# Patient Record
Sex: FEMALE | Race: WHITE | NOT HISPANIC OR LATINO | URBAN - METROPOLITAN AREA
[De-identification: names, ages, dates, MRNs, and addresses within clinical notes are randomized per-mention and may not be internally consistent; named-entity substitution may affect disease eponyms.]

---

## 2018-09-30 ENCOUNTER — EMERGENCY (EMERGENCY)
Facility: HOSPITAL | Age: 48
LOS: 1 days | Discharge: HOME | End: 2018-09-30
Attending: EMERGENCY MEDICINE
Payer: COMMERCIAL

## 2018-09-30 VITALS
OXYGEN SATURATION: 98 % | TEMPERATURE: 98 F | SYSTOLIC BLOOD PRESSURE: 126 MMHG | HEART RATE: 65 BPM | DIASTOLIC BLOOD PRESSURE: 63 MMHG | RESPIRATION RATE: 20 BRPM

## 2018-09-30 VITALS
SYSTOLIC BLOOD PRESSURE: 132 MMHG | OXYGEN SATURATION: 99 % | RESPIRATION RATE: 18 BRPM | DIASTOLIC BLOOD PRESSURE: 68 MMHG | HEART RATE: 62 BPM

## 2018-09-30 LAB
ANION GAP SERPL CALC-SCNC: 10 MMOL/L — SIGNIFICANT CHANGE UP (ref 5–17)
APTT BLD: 26.1 SEC — LOW (ref 27.5–37.4)
BUN SERPL-MCNC: 15 MG/DL — SIGNIFICANT CHANGE UP (ref 8–20)
CALCIUM SERPL-MCNC: 8.7 MG/DL — SIGNIFICANT CHANGE UP (ref 8.6–10.2)
CHLORIDE SERPL-SCNC: 107 MMOL/L — SIGNIFICANT CHANGE UP (ref 98–107)
CK SERPL-CCNC: 82 U/L — SIGNIFICANT CHANGE UP (ref 25–170)
CO2 SERPL-SCNC: 22 MMOL/L — SIGNIFICANT CHANGE UP (ref 22–29)
CREAT SERPL-MCNC: 0.49 MG/DL — LOW (ref 0.5–1.3)
D DIMER BLD IA.RAPID-MCNC: 216 NG/ML DDU — SIGNIFICANT CHANGE UP
GLUCOSE SERPL-MCNC: 91 MG/DL — SIGNIFICANT CHANGE UP (ref 70–115)
HCT VFR BLD CALC: 30.1 % — LOW (ref 37–47)
HGB BLD-MCNC: 8.2 G/DL — LOW (ref 12–16)
INR BLD: 1.04 RATIO — SIGNIFICANT CHANGE UP (ref 0.88–1.16)
MCHC RBC-ENTMCNC: 18.7 PG — LOW (ref 27–31)
MCHC RBC-ENTMCNC: 27.2 G/DL — LOW (ref 32–36)
MCV RBC AUTO: 68.6 FL — LOW (ref 81–99)
PLATELET # BLD AUTO: 204 K/UL — SIGNIFICANT CHANGE UP (ref 150–400)
POTASSIUM SERPL-MCNC: 4.7 MMOL/L — SIGNIFICANT CHANGE UP (ref 3.5–5.3)
POTASSIUM SERPL-SCNC: 4.7 MMOL/L — SIGNIFICANT CHANGE UP (ref 3.5–5.3)
PROTHROM AB SERPL-ACNC: 11.4 SEC — SIGNIFICANT CHANGE UP (ref 9.8–12.7)
RBC # BLD: 4.39 M/UL — LOW (ref 4.4–5.2)
RBC # FLD: 17.4 % — HIGH (ref 11–15.6)
SODIUM SERPL-SCNC: 139 MMOL/L — SIGNIFICANT CHANGE UP (ref 135–145)
TROPONIN T SERPL-MCNC: <0.01 NG/ML — SIGNIFICANT CHANGE UP (ref 0–0.06)
TROPONIN T SERPL-MCNC: <0.01 NG/ML — SIGNIFICANT CHANGE UP (ref 0–0.06)
WBC # BLD: 4.4 K/UL — LOW (ref 4.8–10.8)
WBC # FLD AUTO: 4.4 K/UL — LOW (ref 4.8–10.8)

## 2018-09-30 PROCEDURE — 93010 ELECTROCARDIOGRAM REPORT: CPT

## 2018-09-30 PROCEDURE — 85027 COMPLETE CBC AUTOMATED: CPT

## 2018-09-30 PROCEDURE — 82550 ASSAY OF CK (CPK): CPT

## 2018-09-30 PROCEDURE — 93005 ELECTROCARDIOGRAM TRACING: CPT

## 2018-09-30 PROCEDURE — 85730 THROMBOPLASTIN TIME PARTIAL: CPT

## 2018-09-30 PROCEDURE — 36415 COLL VENOUS BLD VENIPUNCTURE: CPT

## 2018-09-30 PROCEDURE — 84484 ASSAY OF TROPONIN QUANT: CPT

## 2018-09-30 PROCEDURE — 99285 EMERGENCY DEPT VISIT HI MDM: CPT

## 2018-09-30 PROCEDURE — 80048 BASIC METABOLIC PNL TOTAL CA: CPT

## 2018-09-30 PROCEDURE — 99283 EMERGENCY DEPT VISIT LOW MDM: CPT | Mod: 25

## 2018-09-30 PROCEDURE — 71045 X-RAY EXAM CHEST 1 VIEW: CPT

## 2018-09-30 PROCEDURE — 85610 PROTHROMBIN TIME: CPT

## 2018-09-30 PROCEDURE — 71045 X-RAY EXAM CHEST 1 VIEW: CPT | Mod: 26

## 2018-09-30 PROCEDURE — 85379 FIBRIN DEGRADATION QUANT: CPT

## 2018-09-30 RX ORDER — SODIUM CHLORIDE 9 MG/ML
3 INJECTION INTRAMUSCULAR; INTRAVENOUS; SUBCUTANEOUS ONCE
Qty: 0 | Refills: 0 | Status: COMPLETED | OUTPATIENT
Start: 2018-09-30 | End: 2018-09-30

## 2018-09-30 RX ADMIN — SODIUM CHLORIDE 3 MILLILITER(S): 9 INJECTION INTRAMUSCULAR; INTRAVENOUS; SUBCUTANEOUS at 14:30

## 2018-09-30 NOTE — ED PROVIDER NOTE - CHPI ED SYMPTOMS NEG
no dizziness/no fever/no diaphoresis/no shortness of breath/no chills/no nausea/no syncope/no cough/no back pain/no vomiting

## 2018-09-30 NOTE — ED PROVIDER NOTE - MEDICAL DECISION MAKING DETAILS
PT WITH CHEST PAIN . NORMAL EKG AND NO CARDIAC RISK FACTORS. WILL REPEAT TROPONIN AND OBTAIN CARDIAC CONSULT

## 2018-09-30 NOTE — ED PROVIDER NOTE - NOTES
TELEPHONE CONSULT DR RIZZO - PT WITH NO RISK FACTORS. IF TWO NEG ENZYME, OUT PT FU FOR STRESS ECHO IN OFFICE

## 2018-09-30 NOTE — ED PROVIDER NOTE - CARE PROVIDER_API CALL
Berenice Haider), Cardiology; Cardiovascular Disease; Internal Medicine  [address] [place] [address]  Phone: [phone]  Fax: [phone]

## 2018-09-30 NOTE — ED PROVIDER NOTE - OBJECTIVE STATEMENT
CC CHEST PAIN  47 YO FEMALE WITH ABOVE CC. SUDDEN ONSET CHEST TIGHTNESS FOR A COUPLE OF DAYS. TIGHT FEELING RADIATES TO LEFT SHOULDER. NO ASSOCIATED SOB, N, V, DIAPHORESIS. NO PAST SIMILAR EPISODES.  NO CARDIAC RISK FACTORS  MED HX NEGATIVE

## 2018-09-30 NOTE — ED ADULT NURSE NOTE - NEURO WDL
no Alert and oriented to person, place and time, memory intact, behavior appropriate to situation, PERRL.

## 2018-09-30 NOTE — ED ADULT NURSE NOTE - OBJECTIVE STATEMENT
recd pt  A/Ox3, pt c/o 8/10 left sided CP, that started since 8am this morning, pt thought it was related to her anxiety and states took some vistaril this morning w/o relief, went to urgent care and received 4 baby aspirin. pt describes pain as squeezing, pt states left arm also feels heavy and reciprocates the cp with movement of arm. pt denies sob. Respirations are even and unlabored, lungs cta, +bowel x4 quads, abdomen soft, nontender/nondistended, no guarding, rebound or rigidity noted, skin w/d/i.

## 2018-10-01 RX ORDER — HYDROXYZINE HCL 10 MG
0 TABLET ORAL
Qty: 0 | Refills: 0 | COMMUNITY

## 2018-10-01 RX ORDER — ESCITALOPRAM OXALATE 10 MG/1
0 TABLET, FILM COATED ORAL
Qty: 0 | Refills: 0 | COMMUNITY

## 2020-05-27 ENCOUNTER — APPOINTMENT (OUTPATIENT)
Dept: DERMATOLOGY | Facility: CLINIC | Age: 50
End: 2020-05-27
Payer: COMMERCIAL

## 2020-05-27 ENCOUNTER — RESULT REVIEW (OUTPATIENT)
Age: 50
End: 2020-05-27

## 2020-05-27 PROCEDURE — 11104 PUNCH BX SKIN SINGLE LESION: CPT

## 2020-05-27 PROCEDURE — 99202 OFFICE O/P NEW SF 15 MIN: CPT | Mod: 25

## 2020-06-03 PROBLEM — Z00.00 ENCOUNTER FOR PREVENTIVE HEALTH EXAMINATION: Status: ACTIVE | Noted: 2020-06-03

## 2020-06-05 ENCOUNTER — APPOINTMENT (OUTPATIENT)
Dept: DERMATOLOGY | Facility: CLINIC | Age: 50
End: 2020-06-05
Payer: COMMERCIAL

## 2020-06-05 PROCEDURE — 99213 OFFICE O/P EST LOW 20 MIN: CPT

## 2020-06-08 ENCOUNTER — LABORATORY RESULT (OUTPATIENT)
Age: 50
End: 2020-06-08

## 2020-06-08 ENCOUNTER — APPOINTMENT (OUTPATIENT)
Dept: RHEUMATOLOGY | Facility: CLINIC | Age: 50
End: 2020-06-08
Payer: COMMERCIAL

## 2020-06-08 VITALS
DIASTOLIC BLOOD PRESSURE: 85 MMHG | SYSTOLIC BLOOD PRESSURE: 159 MMHG | OXYGEN SATURATION: 98 % | BODY MASS INDEX: 49.08 KG/M2 | HEART RATE: 70 BPM | WEIGHT: 250 LBS | TEMPERATURE: 98 F | HEIGHT: 60 IN

## 2020-06-08 DIAGNOSIS — Z84.89 FAMILY HISTORY OF OTHER SPECIFIED CONDITIONS: ICD-10-CM

## 2020-06-08 DIAGNOSIS — Z78.9 OTHER SPECIFIED HEALTH STATUS: ICD-10-CM

## 2020-06-08 DIAGNOSIS — L56.8 OTHER SPECIFIED ACUTE SKIN CHANGES DUE TO ULTRAVIOLET RADIATION: ICD-10-CM

## 2020-06-08 DIAGNOSIS — E55.9 VITAMIN D DEFICIENCY, UNSPECIFIED: ICD-10-CM

## 2020-06-08 DIAGNOSIS — R21 RASH AND OTHER NONSPECIFIC SKIN ERUPTION: ICD-10-CM

## 2020-06-08 DIAGNOSIS — Z82.61 FAMILY HISTORY OF ARTHRITIS: ICD-10-CM

## 2020-06-08 DIAGNOSIS — Z98.84 BARIATRIC SURGERY STATUS: ICD-10-CM

## 2020-06-08 DIAGNOSIS — Z82.0 FAMILY HISTORY OF EPILEPSY AND OTHER DISEASES OF THE NERVOUS SYSTEM: ICD-10-CM

## 2020-06-08 PROCEDURE — 99204 OFFICE O/P NEW MOD 45 MIN: CPT | Mod: 25

## 2020-06-08 PROCEDURE — 36415 COLL VENOUS BLD VENIPUNCTURE: CPT

## 2020-06-08 RX ORDER — NAPROXEN SODIUM 220 MG
TABLET ORAL
Refills: 0 | Status: ACTIVE | COMMUNITY

## 2020-06-08 NOTE — REVIEW OF SYSTEMS
[Feeling Tired] : feeling tired [Arthralgias] : arthralgias [Joint Pain] : joint pain [Skin Lesions] : skin lesion [Negative] : Heme/Lymph

## 2020-06-09 LAB
25(OH)D3 SERPL-MCNC: 14 NG/ML
ALBUMIN MFR SERPL ELPH: 57.4 %
ALBUMIN SERPL ELPH-MCNC: 4.1 G/DL
ALBUMIN SERPL-MCNC: 3.6 G/DL
ALBUMIN/GLOB SERPL: 1.3 RATIO
ALP BLD-CCNC: 91 U/L
ALPHA1 GLOB MFR SERPL ELPH: 4.4 %
ALPHA1 GLOB SERPL ELPH-MCNC: 0.3 G/DL
ALPHA2 GLOB MFR SERPL ELPH: 13.6 %
ALPHA2 GLOB SERPL ELPH-MCNC: 0.9 G/DL
ALT SERPL-CCNC: 15 U/L
ANA SER IF-ACNC: NEGATIVE
ANION GAP SERPL CALC-SCNC: 11 MMOL/L
AST SERPL-CCNC: 33 U/L
B-GLOBULIN MFR SERPL ELPH: 13.4 %
B-GLOBULIN SERPL ELPH-MCNC: 0.8 G/DL
BILIRUB SERPL-MCNC: 0.4 MG/DL
BUN SERPL-MCNC: 17 MG/DL
C3 SERPL-MCNC: 113 MG/DL
C4 SERPL-MCNC: 29 MG/DL
CALCIUM SERPL-MCNC: 9 MG/DL
CENTROMERE IGG SER-ACNC: <0.2 CD:130001892
CHLORIDE SERPL-SCNC: 99 MMOL/L
CK SERPL-CCNC: 98 U/L
CO2 SERPL-SCNC: 24 MMOL/L
CREAT SERPL-MCNC: 0.5 MG/DL
CRP SERPL-MCNC: <0.1 MG/DL
DEPRECATED KAPPA LC FREE/LAMBDA SER: 1.07 RATIO
DSDNA AB SER-ACNC: <12 IU/ML
ENA JO1 AB SER IA-ACNC: <0.2 AL
ENA RNP AB SER IA-ACNC: <0.2 AL
ENA SM AB SER IA-ACNC: <0.2 AL
ENA SS-A AB SER IA-ACNC: <0.2 AL
ENA SS-B AB SER IA-ACNC: <0.2 AL
GAMMA GLOB FLD ELPH-MCNC: 0.7 G/DL
GAMMA GLOB MFR SERPL ELPH: 11.2 %
GLUCOSE SERPL-MCNC: 84 MG/DL
HAV IGM SER QL: NONREACTIVE
HBV CORE IGM SER QL: NONREACTIVE
HBV SURFACE AG SER QL: NONREACTIVE
HCV AB SER QL: NONREACTIVE
HCV S/CO RATIO: 0.09 S/CO
IGA SER QL IEP: 247 MG/DL
IGG SER QL IEP: 625 MG/DL
IGM SER QL IEP: 47 MG/DL
INTERPRETATION SERPL IEP-IMP: NORMAL
KAPPA LC CSF-MCNC: 1.16 MG/DL
KAPPA LC SERPL-MCNC: 1.24 MG/DL
LUPUS ANTICOAGULANT CASCADE REFLEX: NORMAL
M PROTEIN SPEC IFE-MCNC: NORMAL
MPO AB + PR3 PNL SER: NORMAL
POTASSIUM SERPL-SCNC: 4.7 MMOL/L
PROT SERPL-MCNC: 6.3 G/DL
RHEUMATOID FACT SER QL: <10 IU/ML
SODIUM SERPL-SCNC: 134 MMOL/L
THYROGLOB AB SERPL-ACNC: <20 IU/ML
THYROPEROXIDASE AB SERPL IA-ACNC: <10 IU/ML
TSH SERPL-ACNC: 1.83 UIU/ML

## 2020-06-09 NOTE — HISTORY OF PRESENT ILLNESS
[FreeTextEntry1] : 49-year-old  female with a history of depression presents as a new patient today. She presents to rule out possible autoimmune disease.\par \par She also has a history of gastric bypass. She states that for the last couple of years now she has been dealing with complaints of shingles. The initial episode began about 3 years ago. It had a classic presentation. With each episode she typically uses antiviral medication and it resolves over the course of a few weeks. Each episode could happen about once a month. More recently she noticed the lesions on her face, it did not respond to the antiviral and she decided to see dermatology. She start dermatology she was told that she has a lupus-like rash. She did have a skin biopsy which was read as possible autoimmune in nature given interface changes but also drug eruption could not be ruled out.\par \par She admits to a total of 8 pregnancies but has had one full-term delivery. She has had 6 miscarriages with one stillbirth. She states that she was told it was secondary to protein C deficiency, during the pregnancy she did use a blood thinner. She had one blood clot post partum within the week off giving birth to her child. Currently she does not use aspirin. She does see hematology once a year at least. She admits that her sister has also had issues with getting pregnant.\par \par She admits to some hair thinning. She admits to dry eyes and dry mouth and occasionally gets blisters on her mouth. She denies cold sensitivity. She denies psoriasis or colitis. She denies asthma, frequent sinus infections or ear infections. She Admits to joint pains particularly in her knees. She has intentionally lost20+ pounds in the last year.\par \par She is an average appetite and denies weight loss. She denies fevers or chills or night sweats. She is otherwise up-to-date on her age-appropriate screenings.

## 2020-06-09 NOTE — PHYSICAL EXAM
[General Appearance - Alert] : alert [General Appearance - Well Nourished] : well nourished [Sclera] : the sclera and conjunctiva were normal [General Appearance - Well Developed] : well developed [Oropharynx] : the oropharynx was normal [Neck Appearance] : the appearance of the neck was normal [Respiration, Rhythm And Depth] : normal respiratory rhythm and effort [Auscultation Breath Sounds / Voice Sounds] : lungs were clear to auscultation bilaterally [Heart Sounds] : normal S1 and S2 [Edema] : there was no peripheral edema [Full Pulse] : the pedal pulses are present [Abdomen Tenderness] : non-tender [Cervical Lymph Nodes Enlarged Anterior Bilaterally] : anterior cervical [Supraclavicular Lymph Nodes Enlarged Bilaterally] : supraclavicular [Abnormal Walk] : normal gait [No Spinal Tenderness] : no spinal tenderness [Nail Clubbing] : no clubbing  or cyanosis of the fingernails [Musculoskeletal - Swelling] : no joint swelling seen [Motor Tone] : muscle strength and tone were normal [Deep Tendon Reflexes (DTR)] : deep tendon reflexes were 2+ and symmetric [Motor Exam] : the motor exam was normal [Oriented To Time, Place, And Person] : oriented to person, place, and time [Impaired Insight] : insight and judgment were intact [Affect] : the affect was normal [FreeTextEntry1] : few skin lesions, arms and face, on photosensitive areas

## 2020-06-09 NOTE — PHYSICAL EXAM
[General Appearance - Well Nourished] : well nourished [General Appearance - Alert] : alert [General Appearance - Well Developed] : well developed [Sclera] : the sclera and conjunctiva were normal [Oropharynx] : the oropharynx was normal [Neck Appearance] : the appearance of the neck was normal [Respiration, Rhythm And Depth] : normal respiratory rhythm and effort [Heart Sounds] : normal S1 and S2 [Auscultation Breath Sounds / Voice Sounds] : lungs were clear to auscultation bilaterally [Full Pulse] : the pedal pulses are present [Edema] : there was no peripheral edema [Cervical Lymph Nodes Enlarged Anterior Bilaterally] : anterior cervical [Abdomen Tenderness] : non-tender [Supraclavicular Lymph Nodes Enlarged Bilaterally] : supraclavicular [No Spinal Tenderness] : no spinal tenderness [Abnormal Walk] : normal gait [Musculoskeletal - Swelling] : no joint swelling seen [Nail Clubbing] : no clubbing  or cyanosis of the fingernails [Motor Tone] : muscle strength and tone were normal [Deep Tendon Reflexes (DTR)] : deep tendon reflexes were 2+ and symmetric [Oriented To Time, Place, And Person] : oriented to person, place, and time [Motor Exam] : the motor exam was normal [Impaired Insight] : insight and judgment were intact [Affect] : the affect was normal [FreeTextEntry1] : few skin lesions, arms and face, on photosensitive areas

## 2020-06-09 NOTE — ASSESSMENT
[FreeTextEntry1] : 49 year old  female with a history of obesity status post gastric bypass surgery, depression presents for further evaluation of rashes.\par \par She states that she was in her usual state of health until about 3 years ago when she had an outbreak of shingles. It had a classic appearance. Over the last 3 years she has been dealing with facial rashes, but typically respond to antiviral treatment. She has been getting them almost every month. Recently she went to see dermatology and had a skin biopsy which is consistent with either a drug eruption versus given the interface changes possibly an autoimmune disease as well. She was told by dermatology that she may have lupus.\par \par Current review of systems is positive for photosensitive rashes, itching, some hair thinning dry eyes, and she also admits to multiple miscarriages a total of 6 but which was attributed to protein C deficiency in the past.\par \par Today, on examination she has full range of motion of her joints, she has osteoarthritis in her knees but the right is worse than the left. She also has some excoriated lesions on both arms which appear to be photosensitive in nature. She also has some photosensitive lesions on her chest wall and the face. There are no lesions on the non-sun exposed areas.\par No labs available for review today.\par \par Based on her history and exam certainly autoimmune disease is in the differential diagnosis for the photosensitive rash. This includes lupus, in particular cutaneous lupus, myositis, antiphospholipid antibody syndrome is also in the differential diagnosis, drug-induced lupus is another possibility.\par \par Plan-\par -She is to have baseline labs done to rule out the above conditions\par -Recommend avoiding sun exposure for now\par -To use a topical ointment prescribed by dermatology\par -I will be calling her with the results\par -Depending on the results we'll consider Plaquenil use of a photosensitive rash\par -She is agreeable to that time\par -Followup 6 weeks\par -She suffered a call if her symptoms worsen

## 2020-06-09 NOTE — CONSULT LETTER
[Dear  ___] : Dear  [unfilled], [Consult Letter:] : I had the pleasure of evaluating your patient, [unfilled]. [Please see my note below.] : Please see my note below. [Consult Closing:] : Thank you very much for allowing me to participate in the care of this patient.  If you have any questions, please do not hesitate to contact me. [Sincerely,] : Sincerely, [FreeTextEntry3] : Leticia Jules D.O\par

## 2020-06-10 LAB
CARDIOLIPIN IGM SER-MCNC: 5 MPL
CARDIOLIPIN IGM SER-MCNC: <5 GPL
HISTONE AB SER QL: 0.3 UNITS

## 2020-06-15 PROBLEM — E55.9 VITAMIN D INSUFFICIENCY: Status: ACTIVE | Noted: 2020-06-15

## 2020-06-15 LAB
CCP AB SER IA-ACNC: <8 UNITS
RF+CCP IGG SER-IMP: NEGATIVE

## 2020-06-15 RX ORDER — ERGOCALCIFEROL 1.25 MG/1
1.25 MG CAPSULE, LIQUID FILLED ORAL
Qty: 4 | Refills: 6 | Status: ACTIVE | COMMUNITY
Start: 2020-06-15 | End: 1900-01-01

## 2020-08-07 ENCOUNTER — APPOINTMENT (OUTPATIENT)
Dept: RHEUMATOLOGY | Facility: CLINIC | Age: 50
End: 2020-08-07

## 2021-07-28 DIAGNOSIS — G47.00 INSOMNIA, UNSPECIFIED: ICD-10-CM

## 2021-07-28 RX ORDER — CITALOPRAM 40 MG/1
40 TABLET, FILM COATED ORAL
Refills: 0 | Status: ACTIVE | COMMUNITY

## 2021-07-28 RX ORDER — ESCITALOPRAM OXALATE 20 MG/1
20 TABLET, FILM COATED ORAL
Refills: 0 | Status: DISCONTINUED | COMMUNITY
End: 2021-07-28

## 2021-08-04 ENCOUNTER — APPOINTMENT (OUTPATIENT)
Dept: CARDIOLOGY | Facility: CLINIC | Age: 51
End: 2021-08-04
Payer: COMMERCIAL

## 2021-08-04 VITALS
RESPIRATION RATE: 16 BRPM | SYSTOLIC BLOOD PRESSURE: 114 MMHG | DIASTOLIC BLOOD PRESSURE: 76 MMHG | BODY MASS INDEX: 39.46 KG/M2 | HEART RATE: 68 BPM | WEIGHT: 201 LBS | HEIGHT: 60 IN

## 2021-08-04 DIAGNOSIS — Z78.9 OTHER SPECIFIED HEALTH STATUS: ICD-10-CM

## 2021-08-04 DIAGNOSIS — Z82.49 FAMILY HISTORY OF ISCHEMIC HEART DISEASE AND OTHER DISEASES OF THE CIRCULATORY SYSTEM: ICD-10-CM

## 2021-08-04 DIAGNOSIS — Z83.438 FAMILY HISTORY OF OTHER DISORDER OF LIPOPROTEIN METABOLISM AND OTHER LIPIDEMIA: ICD-10-CM

## 2021-08-04 DIAGNOSIS — D64.9 ANEMIA, UNSPECIFIED: ICD-10-CM

## 2021-08-04 PROCEDURE — 93000 ELECTROCARDIOGRAM COMPLETE: CPT

## 2021-08-04 PROCEDURE — 99244 OFF/OP CNSLTJ NEW/EST MOD 40: CPT

## 2021-08-04 RX ORDER — CYANOCOBALAMIN, ISOPROPYL ALCOHOL 1000MCG/ML
KIT INJECTION
Refills: 0 | Status: ACTIVE | COMMUNITY

## 2021-08-04 NOTE — DISCUSSION/SUMMARY
[FreeTextEntry1] : ECG\par \par \par \par \par \par Plan\par 1. Full set of bw to include lipids, TSH, A1C and renal fxn/ electrolytes to r/o imbalance\par 2. Suggest OTC magnesium 260 mg QD to help with palps.\par 3. Schedule echo, EST and 24 hr Holter to r/o arrhythmia and risk stratify.\par 4. Continue weight loss efforts.\par \par Clinical follow up after testing is completed.

## 2021-08-04 NOTE — HISTORY OF PRESENT ILLNESS
[FreeTextEntry1] : Exercises with a bicycle and walks. Denies any exertional discomfort.\par \par Never any exertional chest pain, orthopnea or PND\par \par Did have BW done through heme. last week which is not available.

## 2021-08-04 NOTE — ASSESSMENT
[FreeTextEntry1] : ECG: Normal sinus rhythm at 68.  Nonspecific T wave maladies.\par \par No pertinent lab data is available at this time.\par \par Impression:\par 1.  Recent onset of palpitations likely due to APCs and PVCs seen on self telemetry.\par \par 2.  No other active cardiac symptoms exertional or otherwise.\par \par 3.  Marked fluctuations in weight related to initially to gastric bypass surgery and more recently to weight watchers and exercise.\par By self-report down 100 pounds in the last 18 months\par \par 4.  Nonspecific ECG abnormalities\par \par 5.  No history of hypertension, diabetes known hyperlipidemia or tobacco use.\par \par Angel:\par 1.  24-hour Holter monitor to assess the arrhythmia.\par \par 2.  Echocardiogram looking for cardiomegaly as might be related to longstanding history of morbid obesity\par \par 3.  Exercise stress test to risk stratify with regard to exertion.\par \par 4.  Magnesium supplementation encouraged 250 mg daily.

## 2021-08-04 NOTE — REASON FOR VISIT
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 \par \par Symptom started in June which is associated with a throat tightening sensation. Episodes, ,which are nonexertional, last only a few minutes and are not associated with any CP.  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  Decreased caffeine recently use to drink 30 oz of coffee now 10 oz.\par Poor sleeping habit, by her report she was tested for JONATHAN  many years ago. \par No significant EtOH.\par \par Only other medical hx includes anemia caused by her gastric bypass and a malabsorption issue. There was x 1 episode of CP during a period where she was anemic.  She continues to see hematology for this. \par \par Max weight  = 343, admits that weight fluctuates with stress. Recently loss 100 lbs in the last 18 months. \par Exercise and weight watchers\par \par Went into menopause 13 months ago.

## 2021-08-09 ENCOUNTER — APPOINTMENT (OUTPATIENT)
Dept: CARDIOLOGY | Facility: CLINIC | Age: 51
End: 2021-08-09
Payer: COMMERCIAL

## 2021-08-09 PROCEDURE — ZZZZZ: CPT

## 2021-09-03 ENCOUNTER — APPOINTMENT (OUTPATIENT)
Dept: CARDIOLOGY | Facility: CLINIC | Age: 51
End: 2021-09-03
Payer: COMMERCIAL

## 2021-09-03 PROCEDURE — 93306 TTE W/DOPPLER COMPLETE: CPT

## 2021-09-03 PROCEDURE — 93015 CV STRESS TEST SUPVJ I&R: CPT

## 2021-09-13 ENCOUNTER — NON-APPOINTMENT (OUTPATIENT)
Age: 51
End: 2021-09-13

## 2021-09-23 ENCOUNTER — APPOINTMENT (OUTPATIENT)
Dept: CARDIOLOGY | Facility: CLINIC | Age: 51
End: 2021-09-23
Payer: COMMERCIAL

## 2021-09-23 VITALS
DIASTOLIC BLOOD PRESSURE: 70 MMHG | WEIGHT: 195 LBS | HEART RATE: 72 BPM | SYSTOLIC BLOOD PRESSURE: 105 MMHG | HEIGHT: 60 IN | BODY MASS INDEX: 38.28 KG/M2 | OXYGEN SATURATION: 98 % | RESPIRATION RATE: 16 BRPM

## 2021-09-23 DIAGNOSIS — R07.89 OTHER CHEST PAIN: ICD-10-CM

## 2021-09-23 DIAGNOSIS — I49.3 VENTRICULAR PREMATURE DEPOLARIZATION: ICD-10-CM

## 2021-09-23 DIAGNOSIS — I49.1 ATRIAL PREMATURE DEPOLARIZATION: ICD-10-CM

## 2021-09-23 DIAGNOSIS — R00.2 PALPITATIONS: ICD-10-CM

## 2021-09-23 PROCEDURE — 93000 ELECTROCARDIOGRAM COMPLETE: CPT

## 2021-09-23 PROCEDURE — 99214 OFFICE O/P EST MOD 30 MIN: CPT

## 2021-09-23 NOTE — ASSESSMENT
[FreeTextEntry1] : Laboratory data 2021:\par Cholesterol 119\par HDL 43\par LDL 63\par Triglycerides 59\par Electrolytes and LFTs normal\par \par Holter monitor 2021:\par Average heart rate 67 range 50 to 230 bpm.\par Multifocal PVCs 3/h with some bigeminy.  No runs of SVT or VT\par \par Echocardiogram 9/3/2021:\par Normal LV size and function ejection fraction 55 to 60%\par Mildly dry left atrium\par Mild mitral and tricuspid regurgitation.\par \par Exercise stress test 9/3/2021:\par Exercise time 6 minutes 55 seconds (8 METS)\par Heart rate 127 (76% predicted) test is considered submaximal\par Blood pressure response normal\par ECG PVCs noted but no ischemic ECG changes\par \par Impression:\par 1.  Recent onset of palpitations likely due to APCs and PVCs seen on self telemetry.\par      Isolated APCs PVCs and occasional couplets seen on Holter monitoring.  Also.  During stress testing.\par      Recent stresses associated with ary perhaps precipitating this.  (Brother  in the [employer])\par 2.  Reduced overall exercise tolerance, over, test is submaximal for excluding ischemia. \par \par 3.  Marked fluctuations in weight related to initially to gastric bypass surgery and more recently to weight      watchers and exercise.\par By self-report down 100 pounds in the last 18 months\par \par 4.  Nonspecific ECG abnormalities\par \par 5.  No history of hypertension, diabetes known hyperlipidemia or tobacco use.\par \par Plan:\par Reassured the patient that there was no evidence of any significant structural heart disease.\par \par Stress testing while submaximal for ischemia did not show anything other than some occasional PVCs and APCs.\par \par Holter monitoring showed rare PVCs and rare bigeminy.\par \par Symptomatically improving at this point does not appear to be an indication for any further investigation or testing.\par Encouraged the patient to continue with exercise and weight loss.  Clinical follow-up here will be in 1 year or sooner if symptoms indicate.

## 2021-09-23 NOTE — HISTORY OF PRESENT ILLNESS
[FreeTextEntry1] : Exercises with a bicycle and walks. Denies any exertional discomfort.\par \par Never any exertional chest pain, orthopnea or PND\par \par Palpitations a bit better in the last 1 to 2 weeks.\par \par

## 2021-09-23 NOTE — PHYSICAL EXAM
[Well Developed] : well developed [Well Nourished] : well nourished [Soft] : abdomen soft [Non Tender] : non-tender [Normal Gait] : normal gait [No Edema] : no edema [No Varicosities] : no varicosities [Normal] : alert and oriented, normal memory

## 2021-09-23 NOTE — REASON FOR VISIT
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 \par \par Symptom started in June which is associated with a throat tightening sensation. Episodes, ,which are nonexertional, last only a few minutes and are not associated with any CP.  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  Decreased caffeine recently (used to drink 30 oz of coffee)  now 10 oz.\par Poor sleeping habit, by her report she was tested for JONATHAN  many years ago. \par No significant ETOH.\par \par Only other medical hx includes anemia caused by her gastric bypass and a malabsorption issue. There was x 1 episode of CP during a period where she was anemic.  She continues to see hematology for this. \par \par Max weight  = 343, admits that weight fluctuates with stress. Recently loss 100 lbs in the last 18 months. \par Exercise and weight watchers\par \par Went into menopause 13 months ago.

## 2021-09-23 NOTE — DISCUSSION/SUMMARY
What Type Of Note Output Would You Prefer (Optional)?: Standard Output How Severe Is Your Rash?: moderate [FreeTextEntry1] : ECG\par \par \par \par \par \par Plan\par 1. Full set of bw to include lipids, TSH, A1C and renal fxn/ electrolytes to r/o imbalance\par 2. Suggest OTC magnesium 260 mg QD to help with palps.\par 3. Schedule echo, EST and 24 hr Holter to r/o arrhythmia and risk stratify.\par 4. Continue weight loss efforts.\par \par Clinical follow up after testing is completed.  Is This A New Presentation, Or A Follow-Up?: Rash

## 2021-09-23 NOTE — REVIEW OF SYSTEMS
[Weight Gain (___ Lbs)] : no recent weight gain [Weight Loss (___ Lbs)] : [unfilled] ~Ulb weight loss [FreeTextEntry2] : Other than as documented here and in the HPI, the thirteen point ROS is negative

## 2022-01-31 ENCOUNTER — TRANSCRIPTION ENCOUNTER (OUTPATIENT)
Age: 52
End: 2022-01-31

## 2022-02-10 ENCOUNTER — APPOINTMENT (OUTPATIENT)
Dept: NEUROLOGY | Facility: CLINIC | Age: 52
End: 2022-02-10
Payer: COMMERCIAL

## 2022-02-10 VITALS
HEIGHT: 60 IN | SYSTOLIC BLOOD PRESSURE: 112 MMHG | WEIGHT: 193 LBS | DIASTOLIC BLOOD PRESSURE: 80 MMHG | BODY MASS INDEX: 37.89 KG/M2

## 2022-02-10 PROCEDURE — 99204 OFFICE O/P NEW MOD 45 MIN: CPT

## 2022-02-10 NOTE — PHYSICAL EXAM
[General Appearance - Alert] : alert [General Appearance - In No Acute Distress] : in no acute distress [General Appearance - Well Nourished] : well nourished [General Appearance - Well Developed] : well developed [Person] : oriented to person [Place] : oriented to place [Time] : oriented to time [Remote Intact] : remote memory intact [Registration Intact] : recent registration memory intact [Span Intact] : the attention span was normal [Concentration Intact] : normal concentrating ability [Visual Intact] : visual attention was ~T not ~L decreased [Naming Objects] : no difficulty naming common objects [Repeating Phrases] : no difficulty repeating a phrase [Fluency] : fluency intact [Comprehension] : comprehension intact [Current Events] : adequate knowledge of current events [Past History] : adequate knowledge of personal past history [Cranial Nerves Optic (II)] : visual acuity intact bilaterally,  visual fields full to confrontation, pupils equal round and reactive to light [Cranial Nerves Oculomotor (III)] : extraocular motion intact [Cranial Nerves Trigeminal (V)] : facial sensation intact symmetrically [Cranial Nerves Facial (VII)] : face symmetrical [Cranial Nerves Vestibulocochlear (VIII)] : hearing was intact bilaterally [Cranial Nerves Glossopharyngeal (IX)] : tongue and palate midline [Cranial Nerves Accessory (XI - Cranial And Spinal)] : head turning and shoulder shrug symmetric [Cranial Nerves Hypoglossal (XII)] : there was no tongue deviation with protrusion [Motor Tone] : muscle tone was normal in all four extremities [Motor Strength] : muscle strength was normal in all four extremities [Involuntary Movements] : no involuntary movements were seen [No Muscle Atrophy] : normal bulk in all four extremities [Paresis Pronator Drift Right-Sided] : no pronator drift on the right [Paresis Pronator Drift Left-Sided] : no pronator drift on the left [Motor Strength Upper Extremities Bilaterally] : strength was normal in both upper extremities [Motor Strength Lower Extremities Bilaterally] : strength was normal in both lower extremities [Sensation Tactile Decrease] : light touch was intact [Sensation Pain / Temperature Decrease] : pain and temperature was intact [Sensation Vibration Decrease] : vibration was intact [Proprioception] : proprioception was intact [Abnormal Walk] : normal gait [Balance] : balance was intact [Tremor] : no tremor present [Coordination - Dysmetria Impaired Finger-to-Nose Bilateral] : not present [2+] : Patella left 2+ [Sclera] : the sclera and conjunctiva were normal [PERRL With Normal Accommodation] : pupils were equal in size, round, reactive to light, with normal accommodation [Extraocular Movements] : extraocular movements were intact [Optic Disc Abnormality] : the optic disc were normal in size and color [No APD] : no afferent pupillary defect [No LANI] : no internuclear ophthalmoplegia [Full Visual Field] : full visual field [Papilledema Of Both Eyes] : no papilledema [Disc Blurred Margins Both Eyes] : sharp margins

## 2022-02-10 NOTE — CONSULT LETTER
[Dear  ___] : Dear  [unfilled], [Consult Letter:] : I had the pleasure of evaluating your patient, [unfilled]. [Please see my note below.] : Please see my note below. [Consult Closing:] : Thank you very much for allowing me to participate in the care of this patient.  If you have any questions, please do not hesitate to contact me. [Sincerely,] : Sincerely, [FreeTextEntry3] : Tank Vasques M.D., Ph.D. DPN-N\par [facility]\par Neurology at [place]\par Medical Director of Stroke Services\par [facility]\par

## 2022-02-10 NOTE — ASSESSMENT
[FreeTextEntry1] : This is a 51-year-old woman who appears in status migrainosus ongoing infection that she was diagnosed in January 3, 2022. At this time I would like to try a Medrol Dosepak to see if her cycle of headache. I will send in also Imitrex injection as well as temperature tablets or as needed. We'll not start a preventive medicine at this time however we will be considered should she continue to have her frequent headaches. Should Medrol Dosepak not help with the migraines I will try Depakote next. She will call me in about a week to let me know how she is doing. If she continues to have frequent headaches despite the efforts to break the headaches I may need to put on a preventive medication in the near future. I will see her back in 2 months, sooner should the need arise.

## 2022-02-10 NOTE — HISTORY OF PRESENT ILLNESS
[FreeTextEntry1] : Office visit February 10, 2022:\par This is a 51-year-old woman who presents today for neurologic evaluation of migraine headache. She has a history of migraine headaches which are usually tolerable. Typically she is able to treat them with Advil alone. However on January 3, 2022 she contracted COVID. Since then the headaches have been out of control. They be 9-10 out of 10 without treatment. They have photophobia, nausea and  occasional vomiting accompanying them. She does not have any significant vision loss with the headaches. She does have a history of pseudotumor cerebri. This was also in the setting of her being heavier in the past. She is here today noting that she went to an urgent care center who gave her lidocaine injection and sumatriptan which helped minimally but she's run out of medication. She is here today for neurologic evaluation and treatment.

## 2022-04-02 ENCOUNTER — TRANSCRIPTION ENCOUNTER (OUTPATIENT)
Age: 52
End: 2022-04-02

## 2022-04-26 ENCOUNTER — APPOINTMENT (OUTPATIENT)
Dept: NEUROLOGY | Facility: CLINIC | Age: 52
End: 2022-04-26
Payer: COMMERCIAL

## 2022-04-26 VITALS — BODY MASS INDEX: 37.3 KG/M2 | WEIGHT: 190 LBS | HEIGHT: 60 IN

## 2022-04-26 PROCEDURE — 99214 OFFICE O/P EST MOD 30 MIN: CPT

## 2022-04-26 RX ORDER — SUMATRIPTAN SUCCINATE 6 MG/.5ML
6 INJECTION SUBCUTANEOUS
Qty: 6 | Refills: 5 | Status: ACTIVE | COMMUNITY
Start: 2022-02-10

## 2022-04-26 RX ORDER — METHYLPREDNISOLONE 4 MG/1
4 TABLET ORAL
Qty: 1 | Refills: 0 | Status: COMPLETED | COMMUNITY
Start: 2022-02-10 | End: 2022-04-26

## 2022-04-26 RX ORDER — SUMATRIPTAN 100 MG/1
100 TABLET, FILM COATED ORAL
Qty: 9 | Refills: 11 | Status: ACTIVE | COMMUNITY
Start: 2022-02-10

## 2022-04-26 NOTE — PHYSICAL EXAM

## 2022-04-26 NOTE — CONSULT LETTER
[Dear  ___] : Dear  [unfilled], [Courtesy Letter:] : I had the pleasure of seeing your patient, [unfilled], in my office today. [Please see my note below.] : Please see my note below. [Consult Closing:] : Thank you very much for allowing me to participate in the care of this patient.  If you have any questions, please do not hesitate to contact me. [Sincerely,] : Sincerely, [FreeTextEntry3] : Tank Vasques M.D., Ph.D. DPN-N\par [facility]\par Neurology at [place]\par Medical Director of Stroke Services\par [facility]\par

## 2022-04-26 NOTE — HISTORY OF PRESENT ILLNESS
[FreeTextEntry1] : Office visit February 10, 2022:\par This is a 51-year-old woman who presents today for neurologic evaluation of migraine headache. She has a history of migraine headaches which are usually tolerable. Typically she is able to treat them with Advil alone. However on January 3, 2022 she contracted COVID. Since then the headaches have been out of control. They be 9-10 out of 10 without treatment. They have photophobia, nausea and  occasional vomiting accompanying them. She does not have any significant vision loss with the headaches. She does have a history of pseudotumor cerebri. This was also in the setting of her being heavier in the past. She is here today noting that she went to an urgent care center who gave her lidocaine injection and sumatriptan which helped minimally but she's run out of medication. She is here today for neurologic evaluation and treatment. \par \par Follow-up April 26, 2022:\par This is a 51-year-old woman who presents today for follow-up of migraine headache.  When he saw her last he was having severe daily headaches.  I given her a Medrol Dosepak as well as sumatriptan.  Currently she reports still daily headaches however they are less in severity and intensity.  They are located primarily behind the left eye and then more of a squeezing sensation.  She will use ibuprofen for this.  She is taking it every day.  She is using 600 to 800 mg per dose.  She is here today for neurologic follow-up.

## 2022-04-26 NOTE — ASSESSMENT
[FreeTextEntry1] : This is a 51-year-old woman with chronic migraine headache.  At this point it is persistent for several months albeit at a lower intensity.  I will prescribe amitriptyline 10 mg at night.  We did discuss the fact that she is on Celexa that we will just utilize low doses of amitriptyline and switch to an alternate preventive agent if needed.  She has been on amitriptyline in the past when she was much younger she states that which did help with her headaches as far she remembers.  I will see her back in 2 months, sooner should the need arise.  I asked her to call me in 1month if the headaches are not better and we can slightly increase the dose.

## 2022-05-24 ENCOUNTER — RX CHANGE (OUTPATIENT)
Age: 52
End: 2022-05-24

## 2022-05-24 RX ORDER — AMITRIPTYLINE HYDROCHLORIDE 10 MG/1
10 TABLET, FILM COATED ORAL
Qty: 30 | Refills: 5 | Status: DISCONTINUED | COMMUNITY
Start: 2022-04-26 | End: 2022-05-24

## 2022-06-14 ENCOUNTER — APPOINTMENT (OUTPATIENT)
Dept: NEUROLOGY | Facility: CLINIC | Age: 52
End: 2022-06-14
Payer: COMMERCIAL

## 2022-06-14 VITALS
BODY MASS INDEX: 38.28 KG/M2 | SYSTOLIC BLOOD PRESSURE: 124 MMHG | HEIGHT: 60 IN | WEIGHT: 195 LBS | DIASTOLIC BLOOD PRESSURE: 82 MMHG

## 2022-06-14 DIAGNOSIS — G43.111 MIGRAINE WITH AURA, INTRACTABLE, WITH STATUS MIGRAINOSUS: ICD-10-CM

## 2022-06-14 PROCEDURE — 99214 OFFICE O/P EST MOD 30 MIN: CPT

## 2022-06-14 RX ORDER — AMITRIPTYLINE HYDROCHLORIDE 10 MG/1
10 TABLET, FILM COATED ORAL
Qty: 90 | Refills: 2 | Status: DISCONTINUED | COMMUNITY
Start: 2022-05-24 | End: 2022-06-14

## 2022-06-14 NOTE — PHYSICAL EXAM

## 2022-06-14 NOTE — ASSESSMENT
[FreeTextEntry1] : This is a 51-year-old woman with chronic headaches.  At this point I would like to try propranolol.  Risks and benefits and side effects were discussed.  Also because of the duration of this prolonged headache I do like to do an MRI of her brain to rule out any structural pathology.  I will see her back in the office in 2 months, sooner should the need arise.  I asked her to call me with any problems, questions or concerns.

## 2022-06-14 NOTE — HISTORY OF PRESENT ILLNESS
[FreeTextEntry1] : Office visit February 10, 2022:\par This is a 51-year-old woman who presents today for neurologic evaluation of migraine headache. She has a history of migraine headaches which are usually tolerable. Typically she is able to treat them with Advil alone. However on January 3, 2022 she contracted COVID. Since then the headaches have been out of control. They be 9-10 out of 10 without treatment. They have photophobia, nausea and  occasional vomiting accompanying them. She does not have any significant vision loss with the headaches. She does have a history of pseudotumor cerebri. This was also in the setting of her being heavier in the past. She is here today noting that she went to an urgent care center who gave her lidocaine injection and sumatriptan which helped minimally but she's run out of medication. She is here today for neurologic evaluation and treatment. \par \par Follow-up April 26, 2022:\par This is a 51-year-old woman who presents today for follow-up of migraine headache.  When he saw her last he was having severe daily headaches.  I given her a Medrol Dosepak as well as sumatriptan.  Currently she reports still daily headaches however they are less in severity and intensity.  They are located primarily behind the left eye and then more of a squeezing sensation.  She will use ibuprofen for this.  She is taking it every day.  She is using 600 to 800 mg per dose.  She is here today for neurologic follow-up.\par \par Follow-up June 14, 2022:\par This is a 51-year-old woman who presents today for follow-up of migraine headache.  She is continuing to get headaches on a constant basis.  Her baseline headache is rated 3 out of 10 but it can accelerate up to 6 out of 10 pain.  Its pressure kind of pain more in the left than the right side of the head.  She had tried amitriptyline but it was not helping and making her too tired so she stopped it after almost 1 month.  She is here today for neurologic follow-up.

## 2022-06-14 NOTE — CONSULT LETTER
[Dear  ___] : Dear  [unfilled], [Courtesy Letter:] : I had the pleasure of seeing your patient, [unfilled], in my office today. [Please see my note below.] : Please see my note below. [Consult Closing:] : Thank you very much for allowing me to participate in the care of this patient.  If you have any questions, please do not hesitate to contact me. [Sincerely,] : Sincerely, [FreeTextEntry3] : Tank Vasques M.D., Ph.D. DPN-N\par [facility]\par [facility] [place]\par Medical Director of Stroke Services\par [facility]\par

## 2022-06-29 ENCOUNTER — RX CHANGE (OUTPATIENT)
Age: 52
End: 2022-06-29

## 2022-06-29 RX ORDER — PROPRANOLOL HYDROCHLORIDE 60 MG/1
60 CAPSULE, EXTENDED RELEASE ORAL
Qty: 90 | Refills: 2 | Status: ACTIVE | COMMUNITY
Start: 2022-06-29 | End: 1900-01-01

## 2022-06-29 RX ORDER — PROPRANOLOL HYDROCHLORIDE 60 MG/1
60 CAPSULE, EXTENDED RELEASE ORAL
Qty: 30 | Refills: 5 | Status: DISCONTINUED | COMMUNITY
Start: 2022-06-14 | End: 2022-06-29

## 2022-08-08 NOTE — DATA REVIEWED
[No studies available for review at this time.] : No studies available for review at this time. [FreeTextEntry3] : Tendon origin injection was performed of the left lateral epicondyle. The indication for this procedure was pain and inflammation. The site was prepped with alcohol, betadine, ethyl chloride sprayed topically and sterile technique used. An injection of Betamethasone (Celestone) 1cc of 3mg, Lidocaine 1cc of 1% , Bupivacaine (Marcaine) 1cc of 0.25%  was used.  Patient has tried OTC's including aspirin, Ibuprofen, Aleve, etc or prescription NSAIDS, and/or exercises at home and/or physical therapy without satisfactory response, patient had decreased mobility in the joint and the risks benefits, and alternatives have been discussed, and verbal consent was obtained.\par

## 2022-08-11 ENCOUNTER — APPOINTMENT (OUTPATIENT)
Dept: NEUROLOGY | Facility: CLINIC | Age: 52
End: 2022-08-11

## 2023-03-17 ENCOUNTER — NON-APPOINTMENT (OUTPATIENT)
Age: 53
End: 2023-03-17

## 2023-12-17 ENCOUNTER — NON-APPOINTMENT (OUTPATIENT)
Age: 53
End: 2023-12-17

## 2023-12-20 ENCOUNTER — NON-APPOINTMENT (OUTPATIENT)
Age: 53
End: 2023-12-20

## 2024-12-11 ENCOUNTER — APPOINTMENT (OUTPATIENT)
Dept: DERMATOLOGY | Facility: CLINIC | Age: 54
End: 2024-12-11
Payer: COMMERCIAL

## 2024-12-11 PROCEDURE — 99203 OFFICE O/P NEW LOW 30 MIN: CPT

## 2025-01-28 ENCOUNTER — APPOINTMENT (OUTPATIENT)
Dept: AFTER HOURS CARE | Facility: EMERGENCY ROOM | Age: 55
End: 2025-01-28
Payer: COMMERCIAL

## 2025-01-28 DIAGNOSIS — R11.2 NAUSEA WITH VOMITING, UNSPECIFIED: ICD-10-CM

## 2025-01-28 PROCEDURE — 99204 OFFICE O/P NEW MOD 45 MIN: CPT | Mod: 95

## 2025-01-28 RX ORDER — ONDANSETRON 4 MG/1
4 TABLET, ORALLY DISINTEGRATING ORAL EVERY 8 HOURS
Qty: 9 | Refills: 0 | Status: ACTIVE | COMMUNITY
Start: 2025-01-28 | End: 1900-01-01

## 2025-03-19 NOTE — ED ADULT NURSE NOTE - CINV DISCH TEACH PARTICIP
Fatigue: Care Instructions  Overview     Fatigue is a feeling of tiredness, exhaustion, or lack of energy. You may feel fatigue because of too much or not enough activity. It can also come from stress, lack of sleep, boredom, and poor diet. Many medical problems, such as viral infections, can cause fatigue. Emotional problems, especially depression, are often the cause of fatigue.  Fatigue is most often a symptom of another problem. Treatment for fatigue depends on the cause. For example, if you have fatigue because you have a certain health problem, treating this problem also treats your fatigue. If depression or anxiety is the cause, treatment may help.  Follow-up care is a key part of your treatment and safety. Be sure to make and go to all appointments, and call your doctor if you are having problems. It's also a good idea to know your test results and keep a list of the medicines you take.  How can you care for yourself at home?  Get regular exercise. But try not to overdo it. It may help to go back and forth between rest and exercise.  Get plenty of rest.  Eat a variety of healthy foods. Try not to skip any meals.  Avoid or try to cut back on your use of caffeine, tobacco, and alcohol. Caffeine is most often found in coffee, tea, cola drinks, and energy drinks.  Limit medicines that can cause fatigue. These include medicines such as cold and allergy medicines.  When should you call for help?  Watch closely for changes in your health, and be sure to contact your doctor if:    You have new symptoms such as fever or a rash.     Your fatigue gets worse.     You have been feeling down, depressed, or hopeless. Or you may have lost interest in things that you usually enjoy.     You are not getting better as expected.   Where can you learn more?  Go to https://www.healthwise.net/patientEd and enter W864 to learn more about \"Fatigue: Care Instructions.\"  Current as of: July 31, 2024  Content Version: 14.4  ©  Patient